# Patient Record
(demographics unavailable — no encounter records)

---

## 2024-11-27 NOTE — ASSESSMENT
[FreeTextEntry1] : 72yo with pmhx of DM, HTN, HLD, cirrhosis, and hepatic lesions on MRI, presents for follow up.  Recent labs by PCP (7/12/2024):  AST 24, ALT 23, alk phos 40, T bili 0.5 Tumor markers not done  #MASLD/MASH cirrhosis - Cirrhosis diagnosed in 2023 based on imaging, likely from MASLD/MASH due to pt's history.  - Discussed the meaning of cirrhosis with patient. Reviewed progression of disease and its potential future complications including esophageal varices with and without bleeding, hepatic encephalopathy, ascites, HCC, and liver failure.  - Advised better control of her metabolic risk factors including DM, HLD, and HTN.   HCC screening: Abd MRI with liver lesions. Follow up MRI in 5/2024 with liver lesion measuring 1.8cm (LI-RADS 2), slightly enlarged in size compared to 1.5cm in 8/2023 and 1.7cm in 5/2023.  - Repeat AFP and  today - Follow up MRI sooner if elevated/uptrending AFP or , otherwise repeat MRI in Oct-Nov 2024.   EV: EGD done in 2023 by Dr. Benjamin beebe per pt. Will get report.  HE: No confusion or delayed in response. Will cont to monitor.  #HTN Elevated BP in office today -- 154/75 Patient admits to high salt diet, and reports poor sleep last night. She states her BP at home is usually 120s.  Advised low salt diet and to monitor BP at home to optimize HTN management.   RTO in in 6 months after repeat MRI for follow up, earlier if elevated tumor markers.

## 2024-11-27 NOTE — REASON FOR VISIT
[Follow-Up: _____] : a [unfilled] follow-up visit [[employer] ] : provided by [employer]   [Interpreters_IDNumber] : [id number] [TWNoteComboBox1] : Chinese

## 2024-11-27 NOTE — PHYSICAL EXAM
[Scleral Icterus] : No Scleral Icterus [Abdominal  Ascites] : no ascites [Non-Tender] : non-tender [Asterixis] : no asterixis observed [Jaundice] : No jaundice [General Appearance - Alert] : alert [General Appearance - In No Acute Distress] : in no acute distress [General Appearance - Well Nourished] : well nourished [General Appearance - Well Developed] : well developed [Sclera] : the sclera and conjunctiva were normal [Neck Appearance] : the appearance of the neck was normal [Neck Cervical Mass (___cm)] : no neck mass was observed [] : no respiratory distress [Exaggerated Use Of Accessory Muscles For Inspiration] : no accessory muscle use [Edema] : there was no peripheral edema [Veins - Varicosity Changes] : there were no varicosital changes [Abdomen Soft] : soft [Abdomen Tenderness] : non-tender [Abdomen Mass (___ Cm)] : no abdominal mass palpated [Abnormal Walk] : normal gait [Oriented To Time, Place, And Person] : oriented to person, place, and time

## 2024-11-27 NOTE — REVIEW OF SYSTEMS
[Fever] : no fever [Chills] : no chills [Feeling Poorly] : not feeling poorly [Feeling Tired] : not feeling tired [Nosebleeds] : no nosebleeds [Nasal Discharge] : no nasal discharge [Sore Throat] : no sore throat [Hoarseness] : no hoarseness [Chest Pain] : no chest pain [Palpitations] : no palpitations [Leg Claudication] : no intermittent leg claudication [Lower Ext Edema] : no lower extremity edema [Shortness Of Breath] : no shortness of breath [Wheezing] : no wheezing [Cough] : no cough [SOB on Exertion] : no shortness of breath during exertion [Abdominal Pain] : no abdominal pain [Vomiting] : no vomiting [Constipation] : no constipation [Diarrhea] : no diarrhea [Melena] : no melena [Itching] : no itching [Dizziness] : no dizziness [Negative] : Heme/Lymph

## 2024-11-27 NOTE — HISTORY OF PRESENT ILLNESS
[FreeTextEntry1] : Referring Physician: Thom Palomares  70yo with pmhx of DM, HTN, HLD, cirrhosis (diagnosed in 2023 with imaging), and hepatic lesions on MRI, presents for follow up.  Patient reports feeling well and has no complaints. She reports about 20lbs weight loss in early 2023 from diet changes after finding out about her cirrhosis. She retired 3mo ago -- now more active than before and walks about an hour every day, with consequent improvement in her DM per patient. She had bloodwork done with PCP, normal liver enzymes, but did not check AFP or . Denies nausea, vomiting, diarrhea, abd pain, or jaundice.  Current meds: losartan, amlodipine, metformin, Lovaza (fish oil), vitamin E  MRI 10/23/2024  multiple arterially enhancing lesions, stable, likely perfusion abnormality   Labs on 10/18/2024 wbc 6.2, hgb 11.8, plt 224ast 23 alt 19 alp 41 tb 0.60   Surgical Hx: None Tobacco: Never Alcohol: no illicit drug: no Herb and dietary Supplement: no FMH of liver disease: None   Labs: 7/12/2024  AST 24, ALT 23, alk phos 40, T bili 0.5  6/14/23 : 48 AFP 5.1 HBCore Ab reactive  6/8/23 AST/ALT 35/36 ALP 50 TB 0.80 H/H 12.1/35.7  HCV Ab nonreactive HBsAg nonreactive HBsAb 185.48 HAV Ab total reactive CEA 2.3 AFP 5.5 Ca19-9: 51.1  3/3/23 AST/ALT 30/28 ALP 47 TB 0.70 H/H 12.0/34.4  A1C 6.4  11/19/22 H/H 12.1/35.9  A1C 6.0 AST/ALT 26/26 ALP 46 TB 0.7  8/19/2022 HAV total Ab positive HCV Ab nonreactive HBsAg nonreactive HBsAb 231.57 A1C 6.2 H/H 11.7/34.4  AST/ALT 37/37 ALP 53 TB 0.7 Cr 0.72  Imaging: Abd MRI (5/29/2024) - cirrhosis, multiple vascular hepatic foci, seen only on the arterial phase and not definitely changed, most likely representing benign perfusion anomalies. YAIR-RADS 2.   Abd MRI 8/17/23: Cirrhotic and steatotic liver. Redemonstration of grossly stable foci of arterial enhancement in the right hepatic lobe without evidence of correlate on any of the other sequences. Findings remain indeterminate but may represent perfusional anomalies (LR-3). a f/u MRI in 6-12 months ins recommended.   Abd MRI 5/22/23: Hepatic cirrhosis and steatosis. 2 indeterminate arterial phase enhancing foci identified in segment 8, the largest measuring 1.7cm, without corresponding signal abnormality of any of the additional imaging sequences. Innumerable small nonenhancing nodular foci through the liver likely representing regenerative nodules.  Abd US 4/12/23: Increased echogenicity and heterogeneity to the liver parenchyma; while this may represent fatty infiltration, oterh diffuse process including but not limited to fibrotic changes associated with cirrhosis cannot be definitely excluded. 2 small hypoechoic nodules within the liver as described.  Abd US 11/24/2021: Stable fatty infiltration of the liver and/or hepatic parenchymal disease. Hepatic cyst. Subcentimeter GB polyp. Renal cysts.   Procedures: Colonoscopy ~ 2020: + polyp as per pt. EGD: ~ 2020 ok as per pt

## 2025-05-14 NOTE — REASON FOR VISIT
[Follow-Up: _____] : a [unfilled] follow-up visit [[employer] ] : provided by [employer]   [Interpreters_IDNumber] : [id number] [TWNoteComboBox1] : Chinese

## 2025-05-14 NOTE — ASSESSMENT
[FreeTextEntry1] : 73yo with pmhx of DM, HTN, HLD, cirrhosis, and hepatic lesions on MRI, presents for follow up.  #MASLD/MASH cirrhosis - Cirrhosis diagnosed in 2023 based on imaging, likely from MASLD/MASH due to pt's history.  - Discussed the meaning of cirrhosis with patient. Reviewed progression of disease and its potential future complications including esophageal varices with and without bleeding, hepatic encephalopathy, ascites, HCC, and liver failure.  - Advised better control of her metabolic risk factors including DM, HLD, and HTN.   multiple liver lesions, enhancing -  will get MRI of abdomen to assess the stability, r/o HCC   EV: EGD done in 2023 by Dr. Benjamin beebe per pt. Will get report.  HE: No confusion or delayed in response. Will cont to monitor.  RTO 6 weeks after MRI soon

## 2025-05-14 NOTE — HISTORY OF PRESENT ILLNESS
[FreeTextEntry1] : Referring Physician: Thom Palomares  73 yo with pmhx of DM, HTN, HLD, cirrhosis (diagnosed in 2023 with imaging), and hepatic lesions on MRI, presents for follow up.  Patient reports feeling well and has no complaints. She reports about 20lbs weight loss in early 2023 from diet changes after finding out about her cirrhosis. She retired 3mo ago -- now more active than before and walks about an hour every day, with consequent improvement in her DM per patient. She had bloodwork done with PCP, normal liver enzymes, but did not check AFP or . Denies nausea, vomiting, diarrhea, abd pain, or jaundice.  Current meds: losartan, amlodipine, metformin, Lovaza (fish oil), vitamin E  5/8/2025 Labs 5/8/2025 ALP 44 AST 25 ALT 20 ALP 44   MRI 10/23/2024  multiple arterially enhancing lesions, stable, likely perfusion abnormality    10/18/2024 wbc 6.2, hgb 11.8, plt 224ast 23 alt 19 alp 41 tb 0.60   Surgical Hx: None Tobacco: Never Alcohol: no illicit drug: no Herb and dietary Supplement: no FMH of liver disease: None   Labs: 7/12/2024  AST 24, ALT 23, alk phos 40, T bili 0.5  6/14/23 : 48 AFP 5.1 HBCore Ab reactive  6/8/23 AST/ALT 35/36 ALP 50 TB 0.80 H/H 12.1/35.7  HCV Ab nonreactive HBsAg nonreactive HBsAb 185.48 HAV Ab total reactive CEA 2.3 AFP 5.5 Ca19-9: 51.1  3/3/23 AST/ALT 30/28 ALP 47 TB 0.70 H/H 12.0/34.4  A1C 6.4  11/19/22 H/H 12.1/35.9  A1C 6.0 AST/ALT 26/26 ALP 46 TB 0.7  8/19/2022 HAV total Ab positive HCV Ab nonreactive HBsAg nonreactive HBsAb 231.57 A1C 6.2 H/H 11.7/34.4  AST/ALT 37/37 ALP 53 TB 0.7 Cr 0.72  Imaging: Abd MRI (5/29/2024) - cirrhosis, multiple vascular hepatic foci, seen only on the arterial phase and not definitely changed, most likely representing benign perfusion anomalies. LI-RADS 2.   Abd MRI 8/17/23: Cirrhotic and steatotic liver. Redemonstration of grossly stable foci of arterial enhancement in the right hepatic lobe without evidence of correlate on any of the other sequences. Findings remain indeterminate but may represent perfusional anomalies (LR-3). a f/u MRI in 6-12 months ins recommended.   Abd MRI 5/22/23: Hepatic cirrhosis and steatosis. 2 indeterminate arterial phase enhancing foci identified in segment 8, the largest measuring 1.7cm, without corresponding signal abnormality of any of the additional imaging sequences. Innumerable small nonenhancing nodular foci through the liver likely representing regenerative nodules.  Abd US 4/12/23: Increased echogenicity and heterogeneity to the liver parenchyma; while this may represent fatty infiltration, oterh diffuse process including but not limited to fibrotic changes associated with cirrhosis cannot be definitely excluded. 2 small hypoechoic nodules within the liver as described.  Abd US 11/24/2021: Stable fatty infiltration of the liver and/or hepatic parenchymal disease. Hepatic cyst. Subcentimeter GB polyp. Renal cysts.   Procedures: Colonoscopy ~ 2020: + polyp as per pt. EGD: ~ 2020 ok as per pt

## 2025-07-01 NOTE — HISTORY OF PRESENT ILLNESS
[FreeTextEntry1] : ANDREI CHAMPION is a 72 year old female being seen for a follow-up visit. Operative Date:  service provided by [employer]  . 's ID Number: [id number] Language: Chinese. Referring Physician: Thom Palomares  Andrei Champion is a 72y female with MASLD/MASH cirrhosis (Dx 2023) and hepatic lesions who presents for the follow-up. Pt also has DM, HTN, HLD. She reports about 20lbs weight loss in early 2023 from diet changes after finding out about her cirrhosis.   Today she reports feeling well and denies nausea, vomiting, diarrhea, abd pain, or jaundice.  [Current meds] losartan, amlodipine, metformin, Lovaza (fish oil), vitamin E  [Medical Hx] as above [Surgical Hx] None Tobacco: Never Alcohol: no illicit drug: no Herb and dietary Supplement: no FMH of liver disease/liver cancer: None  [Labs] 5/14/25 Ca 19-9 36 AFP 5.8  5/8/2025 Labs 5/8/2025 ALP 44 AST 25 ALT 20 ALP 44  10/18/2024 wbc 6.2, hgb 11.8, plt 224ast 23 alt 19 alp 41 tb 0.60  7/12/2024  AST 24, ALT 23, alk phos 40, T bili 0.5  6/14/23 : 48 AFP 5.1 HBCore Ab reactive  6/8/23 AST/ALT 35/36 ALP 50 TB 0.80 H/H 12.1/35.7  HCV Ab nonreactive HBsAg nonreactive HBsAb 185.48 HAV Ab total reactive CEA 2.3 AFP 5.5 Ca19-9: 51.1  3/3/23 AST/ALT 30/28 ALP 47 TB 0.70 H/H 12.0/34.4  A1C 6.4  11/19/22 H/H 12.1/35.9  A1C 6.0 AST/ALT 26/26 ALP 46 TB 0.7  [Imaging] MRI abd EOVIST 5/30/25: LIVER: There is a nodular hepatic contour consistent with cirrhosis. There is signal loss on the T1 out of phase images relative to the in phase images consistent with fatty infiltration of the liver. There are multiple foci of hepatic hyperenhancement. The liver has a stable 2.7 cm enhancing focus dome of liver bordering segment 7 and 8 series 6 image 15. The enhancing hepatic foci are likely benign, however, continued follow-up is recommended given the patient's risk factors. Stable 4mm pancreatic cystic lesion in the pancreatci uncinate process.  Imp Cirrhosis Numerous stable enhancing hepatic lesions likely benign, however, given the patient's risk factors continued HCC surveillance is recommended in 6 months, or as clinically indicated. No significant interval change.  MRI 10/23/2024 multiple arterially enhancing lesions, stable, likely perfusion abnormality  Abd MRI (5/29/2024) - cirrhosis, multiple vascular hepatic foci, seen only on the arterial phase and not definitely changed, most likely representing benign perfusion anomalies. LI-RADS 2.  Abd MRI 8/17/23: Cirrhotic and steatotic liver. Redemonstration of grossly stable foci of arterial enhancement in the right hepatic lobe without evidence of correlate on any of the other sequences. Findings remain indeterminate but may represent perfusional anomalies (LR-3). a f/u MRI in 6-12 months ins recommended.  Abd MRI 5/22/23: Hepatic cirrhosis and steatosis. 2 indeterminate arterial phase enhancing foci identified in segment 8, the largest measuring 1.7cm, without corresponding signal abnormality of any of the additional imaging sequences. Innumerable small nonenhancing nodular foci through the liver likely representing regenerative nodules.  Abd US 4/12/23: Increased echogenicity and heterogeneity to the liver parenchyma; while this may represent fatty infiltration, oterh diffuse process including but not limited to fibrotic changes associated with cirrhosis cannot be definitely excluded. 2 small hypoechoic nodules within the liver as described.  Abd US 11/24/2021: Stable fatty infiltration of the liver and/or hepatic parenchymal disease. Hepatic cyst. Subcentimeter GB polyp. Renal cysts.  Procedures: Colonoscopy ~ 2020: + polyp as per pt. EGD: ~ 2020 ok as per pt

## 2025-07-01 NOTE — ASSESSMENT
[FreeTextEntry1] :  [Assessement]  72y female with MASLD/MASH cirrhosis (Dx 2023) and hepatic lesions   [Plan]  #MASLD/MASH cirrhosis  Age 72 Blood type B per pt Low MELD EV-: EGD done in 2023 by Dr. Benjamin beebe per pt. Will get report. HE-: No confusion or delayed in response. Will cont to monitor. ascites-   - Discussed the meaning of cirrhosis with patient. Reviewed progression of disease and its potential future complications including esophageal varices with and without bleeding, hepatic encephalopathy, ascites, HCC, and liver failure. - Advised better control of her metabolic risk factors including DM, HLD, and HTN.  # multiple liver lesions, enhancing - >> MRI 5/2025: The liver has a stable 2.7 cm enhancing focus dome of liver bordering segment 7 and 8 series 6 image 15. The enhancing hepatic foci are likely benign, will get MRI of abdomen to assess the stability, r/o HCC  # HCM  6/8/23 HCV Ab nonreactive HBsAg nonreactive HBsAb 185.48 HAV Ab total reactive  Labs in 12/2025 MRI abd 12/2025 to screen HCC RTO 12/2025

## 2025-07-01 NOTE — PHYSICAL EXAM
[Non-Tender] : non-tender [General Appearance - Alert] : alert [General Appearance - In No Acute Distress] : in no acute distress [General Appearance - Well Nourished] : well nourished [General Appearance - Well Developed] : well developed [Sclera] : the sclera and conjunctiva were normal [Neck Appearance] : the appearance of the neck was normal [Neck Cervical Mass (___cm)] : no neck mass was observed [] : no respiratory distress [Exaggerated Use Of Accessory Muscles For Inspiration] : no accessory muscle use [Edema] : there was no peripheral edema [Veins - Varicosity Changes] : there were no varicosital changes [Abdomen Soft] : soft [Abdomen Tenderness] : non-tender [Abdomen Mass (___ Cm)] : no abdominal mass palpated [Abnormal Walk] : normal gait [Oriented To Time, Place, And Person] : oriented to person, place, and time [Scleral Icterus] : No Scleral Icterus [Abdominal  Ascites] : no ascites [Asterixis] : no asterixis observed [Jaundice] : No jaundice

## 2025-07-01 NOTE — HISTORY OF PRESENT ILLNESS
[FreeTextEntry1] : ANDREI CHAMPION is a 72 year old female being seen for a follow-up visit. Operative Date:  service provided by [employer]  . 's ID Number: [id number] Language: Chinese. Referring Physician: Thom Palomares  Andrei Champion is a 72y female with MASLD/MASH cirrhosis (Dx 2023) and hepatic lesions who presents for the follow-up. Pt also has DM, HTN, HLD. She reports about 20lbs weight loss in early 2023 from diet changes after finding out about her cirrhosis.   Today she reports feeling well and denies nausea, vomiting, diarrhea, abd pain, or jaundice.  [Current meds] losartan, amlodipine, metformin, Lovaza (fish oil), vitamin E  [Medical Hx] as above [Surgical Hx] None Tobacco: Never Alcohol: no illicit drug: no Herb and dietary Supplement: no FMH of liver disease/liver cancer: None  [Labs] 5/14/25 Ca 19-9 36 AFP 5.8  5/8/2025 Labs 5/8/2025 ALP 44 AST 25 ALT 20 ALP 44  10/18/2024 wbc 6.2, hgb 11.8, plt 224ast 23 alt 19 alp 41 tb 0.60  7/12/2024  AST 24, ALT 23, alk phos 40, T bili 0.5  6/14/23 : 48 AFP 5.1 HBCore Ab reactive  6/8/23 AST/ALT 35/36 ALP 50 TB 0.80 H/H 12.1/35.7  HCV Ab nonreactive HBsAg nonreactive HBsAb 185.48 HAV Ab total reactive CEA 2.3 AFP 5.5 Ca19-9: 51.1  3/3/23 AST/ALT 30/28 ALP 47 TB 0.70 H/H 12.0/34.4  A1C 6.4  11/19/22 H/H 12.1/35.9  A1C 6.0 AST/ALT 26/26 ALP 46 TB 0.7  [Imaging] MRI abd EOVIST 5/30/25: LIVER: There is a nodular hepatic contour consistent with cirrhosis. There is signal loss on the T1 out of phase images relative to the in phase images consistent with fatty infiltration of the liver. There are multiple foci of hepatic hyperenhancement. The liver has a stable 2.7 cm enhancing focus dome of liver bordering segment 7 and 8 series 6 image 15. The enhancing hepatic foci are likely benign, however, continued follow-up is recommended given the patient's risk factors. Stable 4mm pancreatic cystic lesion in the pancreatci uncinate process.  Imp Cirrhosis Numerous stable enhancing hepatic lesions likely benign, however, given the patient's risk factors continued HCC surveillance is recommended in 6 months, or as clinically indicated. No significant interval change.  MRI 10/23/2024 multiple arterially enhancing lesions, stable, likely perfusion abnormality  Abd MRI (5/29/2024) - cirrhosis, multiple vascular hepatic foci, seen only on the arterial phase and not definitely changed, most likely representing benign perfusion anomalies. LI-RADS 2.  Abd MRI 8/17/23: Cirrhotic and steatotic liver. Redemonstration of grossly stable foci of arterial enhancement in the right hepatic lobe without evidence of correlate on any of the other sequences. Findings remain indeterminate but may represent perfusional anomalies (LR-3). a f/u MRI in 6-12 months ins recommended.  Abd MRI 5/22/23: Hepatic cirrhosis and steatosis. 2 indeterminate arterial phase enhancing foci identified in segment 8, the largest measuring 1.7cm, without corresponding signal abnormality of any of the additional imaging sequences. Innumerable small nonenhancing nodular foci through the liver likely representing regenerative nodules.  Abd US 4/12/23: Increased echogenicity and heterogeneity to the liver parenchyma; while this may represent fatty infiltration, oterh diffuse process including but not limited to fibrotic changes associated with cirrhosis cannot be definitely excluded. 2 small hypoechoic nodules within the liver as described.  Abd US 11/24/2021: Stable fatty infiltration of the liver and/or hepatic parenchymal disease. Hepatic cyst. Subcentimeter GB polyp. Renal cysts.  Procedures: Colonoscopy ~ 2020: + polyp as per pt. EGD: ~ 2020 ok as per pt

## 2025-07-01 NOTE — REVIEW OF SYSTEMS
[Negative] : Heme/Lymph [Fever] : no fever [Chills] : no chills [Feeling Poorly] : not feeling poorly [Feeling Tired] : not feeling tired [Nosebleeds] : no nosebleeds [Nasal Discharge] : no nasal discharge [Sore Throat] : no sore throat [Hoarseness] : no hoarseness [Chest Pain] : no chest pain [Palpitations] : no palpitations [Leg Claudication] : no intermittent leg claudication [Lower Ext Edema] : no lower extremity edema [Shortness Of Breath] : no shortness of breath [Wheezing] : no wheezing [Cough] : no cough [SOB on Exertion] : no shortness of breath during exertion [Abdominal Pain] : no abdominal pain [Vomiting] : no vomiting [Constipation] : no constipation [Diarrhea] : no diarrhea [Melena] : no melena [Itching] : no itching [Dizziness] : no dizziness